# Patient Record
Sex: FEMALE | Race: WHITE | NOT HISPANIC OR LATINO | Employment: UNEMPLOYED | URBAN - METROPOLITAN AREA
[De-identification: names, ages, dates, MRNs, and addresses within clinical notes are randomized per-mention and may not be internally consistent; named-entity substitution may affect disease eponyms.]

---

## 2023-10-17 ENCOUNTER — OFFICE VISIT (OUTPATIENT)
Dept: ORTHOPEDIC SURGERY | Facility: CLINIC | Age: 48
End: 2023-10-17
Payer: COMMERCIAL

## 2023-10-17 ENCOUNTER — ANCILLARY PROCEDURE (OUTPATIENT)
Dept: RADIOLOGY | Facility: CLINIC | Age: 48
End: 2023-10-17
Payer: COMMERCIAL

## 2023-10-17 VITALS — HEIGHT: 63 IN | WEIGHT: 165 LBS | BODY MASS INDEX: 29.23 KG/M2

## 2023-10-17 DIAGNOSIS — S89.91XA RIGHT KNEE INJURY: ICD-10-CM

## 2023-10-17 DIAGNOSIS — S80.01XA CONTUSION OF RIGHT KNEE, INITIAL ENCOUNTER: Primary | ICD-10-CM

## 2023-10-17 PROCEDURE — 99203 OFFICE O/P NEW LOW 30 MIN: CPT | Performed by: FAMILY MEDICINE

## 2023-10-17 PROCEDURE — 73562 X-RAY EXAM OF KNEE 3: CPT | Mod: RT

## 2023-10-17 PROCEDURE — 73560 X-RAY EXAM OF KNEE 1 OR 2: CPT | Mod: RIGHT SIDE | Performed by: RADIOLOGY

## 2023-10-17 PROCEDURE — 73562 X-RAY EXAM OF KNEE 3: CPT | Mod: RIGHT SIDE | Performed by: RADIOLOGY

## 2023-10-17 NOTE — PROGRESS NOTES
History of Present Illness   Chief Complaint   Patient presents with    Right Knee - Pain     Pt. States she tripped and fell over curb x3 weeks ago, planting directly on right knee resulting in right knee pain        The patient is 47 y.o. female  here with a complaint of right knee pain.  Acute onset of symptoms approximately 3 weeks ago, patient sustained a mechanical fall landed directly on the anterior aspect of right knee, so she landed on a rubbery track surface, did have acute onset of pain, swelling and bruising.  She says that symptoms have improved gradually over the past 3 weeks however she enjoys doing Pilates and in the if you attempts to return back to this she has developed worsening pain and swelling in her knee, she points to just above the patella as area of focal discomfort.  She denies any locking or catching, no instability, said she was dealing with some morning stiffness and limited range of motion but this is improved.  She has been using over-the-counter medications now more sparingly, was using more regularly initially similar with ice.  She denies any history of knee problems in the past       Past Medical History:   Diagnosis Date    Personal history of diseases of the skin and subcutaneous tissue     History of psoriasis    Personal history of other diseases of the respiratory system     History of asthma    Personal history of other diseases of the respiratory system     History of allergic rhinitis    Personal history of other infectious and parasitic diseases     History of chickenpox    Personal history of other mental and behavioral disorders     History of anxiety       Medication Documentation Review Audit    **Prior to Admission medications have not yet been reviewed**         No Known Allergies    Social History     Socioeconomic History    Marital status: Single     Spouse name: Not on file    Number of children: Not on file    Years of education: Not on file    Highest  education level: Not on file   Occupational History    Not on file   Tobacco Use    Smoking status: Never    Smokeless tobacco: Not on file   Substance and Sexual Activity    Alcohol use: Yes     Comment: moderate    Drug use: Never    Sexual activity: Not on file   Other Topics Concern    Not on file   Social History Narrative    Not on file     Social Determinants of Health     Financial Resource Strain: Not on file   Food Insecurity: Not on file   Transportation Needs: Not on file   Physical Activity: Not on file   Stress: Not on file   Social Connections: Not on file   Intimate Partner Violence: Not on file   Housing Stability: Not on file       Past Surgical History:   Procedure Laterality Date    OTHER SURGICAL HISTORY  2021    Dilation and curettage    OTHER SURGICAL HISTORY  2021     section    OTHER SURGICAL HISTORY  2021    Dilation and curettage    OTHER SURGICAL HISTORY  2021    [place] tooth extraction    OTHER SURGICAL HISTORY  2021    Corneal lasik          Review of Systems   GENERAL: Negative  GI: Negative  MUSCULOSKELETAL: See HPI  SKIN: Negative  NEURO:  Negative     Physical Exam:    General/Constitutional: well appearing, no distress, appears stated age  HEENT: sclera clear  Respiratory: non labored breathing  Vascular: No edema, swelling or tenderness, except as noted in detailed exam.  Integumentary: No impressive skin lesions present, except as noted in detailed exam.  Neurological:  Alert and oriented   Psychological:  Normal mood and affect.  Musculoskeletal: Normal, except as noted in detailed exam and in HPI.  Normal gait, unassisted    Right knee: Normal appearance, there is no soft tissue line, there is no ecchymosis, no joint effusion is present.  She is tender to palpation at the distal quad tendon near its insertion on the superior pole of the patella, there is no bony tenderness at the patella.  No medial or lateral joint line tenderness,  nontender at the distal femur or proximal tibia.  She has good range of motion from 0 to 130 degrees of flexion without pain.  There is pain but no weakness with resisted knee extension, no pain with resisted knee flexion.  Negative Lachman, negative posterior drawer, negative Escalante.  Stable to varus valgus stress           Imaging  X-rays of right knee obtained today and independently reviewed.  No acute abnormalities are seen, no joint effusion, no fractures, no degenerative changes    Assessment   1. Contusion of right knee, initial encounter              Plan: Discussed further work-up and treatment with patient.  Recommended conservative management.  No concern for any significant internal derangement.  She has been doing some physical therapy through her work which she will continue, she will avoid aggravating activities, may have difficulty with quad focused activities over the next few weeks, will also have some challenges with kneeling with Pilates.  Anticipate gradual improvement in symptoms over the next 2 to 4 weeks.  She will follow-up as symptoms dictate